# Patient Record
Sex: MALE | Race: WHITE | Employment: PART TIME | URBAN - METROPOLITAN AREA
[De-identification: names, ages, dates, MRNs, and addresses within clinical notes are randomized per-mention and may not be internally consistent; named-entity substitution may affect disease eponyms.]

---
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
## 2022-05-19 ENCOUNTER — OFFICE VISIT (OUTPATIENT)
Dept: FAMILY MEDICINE CLINIC | Age: 22
End: 2022-05-19
Payer: COMMERCIAL

## 2022-05-19 VITALS
HEART RATE: 72 BPM | DIASTOLIC BLOOD PRESSURE: 72 MMHG | SYSTOLIC BLOOD PRESSURE: 100 MMHG | HEIGHT: 69 IN | WEIGHT: 159 LBS | TEMPERATURE: 97.2 F | BODY MASS INDEX: 23.55 KG/M2 | OXYGEN SATURATION: 98 %

## 2022-05-19 DIAGNOSIS — H53.8 BLURRED VISION: ICD-10-CM

## 2022-05-19 DIAGNOSIS — G44.52 NEW DAILY PERSISTENT HEADACHE: Primary | ICD-10-CM

## 2022-05-19 PROCEDURE — 99204 OFFICE O/P NEW MOD 45 MIN: CPT | Performed by: PHYSICIAN ASSISTANT

## 2022-05-19 RX ORDER — LORATADINE 10 MG/1
10 TABLET ORAL DAILY
Qty: 30 TABLET | Refills: 5 | Status: SHIPPED | OUTPATIENT
Start: 2022-05-19

## 2022-05-19 RX ORDER — IBUPROFEN 800 MG/1
800 TABLET ORAL
Qty: 30 TABLET | Refills: 0 | Status: SHIPPED | OUTPATIENT
Start: 2022-05-19

## 2022-05-19 RX ORDER — FLUTICASONE PROPIONATE 50 MCG
1 SPRAY, SUSPENSION (ML) NASAL DAILY
Qty: 16 G | Refills: 0 | Status: SHIPPED | OUTPATIENT
Start: 2022-05-19

## 2022-05-19 ASSESSMENT — PATIENT HEALTH QUESTIONNAIRE - PHQ9
SUM OF ALL RESPONSES TO PHQ QUESTIONS 1-9: 0
SUM OF ALL RESPONSES TO PHQ9 QUESTIONS 1 & 2: 0
2. FEELING DOWN, DEPRESSED OR HOPELESS: 0
SUM OF ALL RESPONSES TO PHQ QUESTIONS 1-9: 0
1. LITTLE INTEREST OR PLEASURE IN DOING THINGS: 0
SUM OF ALL RESPONSES TO PHQ QUESTIONS 1-9: 0
SUM OF ALL RESPONSES TO PHQ QUESTIONS 1-9: 0

## 2022-05-19 ASSESSMENT — ENCOUNTER SYMPTOMS
DIARRHEA: 0
SORE THROAT: 0
VOMITING: 0
SINUS PRESSURE: 0
COUGH: 0
CONSTIPATION: 0
RHINORRHEA: 1
SINUS PAIN: 0
NAUSEA: 1
PHOTOPHOBIA: 1
ABDOMINAL PAIN: 0
SHORTNESS OF BREATH: 0
WHEEZING: 0
COLOR CHANGE: 0

## 2022-05-19 NOTE — PROGRESS NOTES
[address] [facility]  [address] [facility] [address] [address]  Dept: [phone]  Dept Fax: [phone]    Stef Montana is a 22 y.o. male who presents today for his medical conditions/complaintsas noted below. Stef Montana is c/o of   Chief Complaint   Patient presents with    Migraine     x 2 months started getting them, no nausea or vomiting, the whole head, blurred vision, feels like he could faint    Abdominal Pain     on and off x 1 years,          HPI:     Headache   This is a new problem. The current episode started more than 1 month ago (2 or more ). The problem occurs intermittently. The problem has been waxing and waning. The pain is located in the temporal region. The pain does not radiate. The pain quality is similar to prior headaches. The quality of the pain is described as aching. The pain is moderate. Associated symptoms include nausea, phonophobia, photophobia and rhinorrhea. Pertinent negatives include no abdominal pain, coughing, dizziness, fever, loss of balance, sinus pressure, sore throat, vomiting or weakness. Nothing (sometimes worse when he wakes up. doesn't wake him up. ) aggravates the symptoms. He has tried nothing (has not tried anything) for the symptoms. Improvement on treatment: drinks caffeine/natural caffeines, occasional weed use. His past medical history is significant for migraines in the family (aunt). Patient new to the office and here with his mom for concerns of migraines. Recently home from college for the week, currently goes to college in [place]. Healthy diet, avoids fast foods, avoids fried foods. Drinks water mostly. 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 Sleep schedule does very night tonight. Patient states headaches sometimes are very severe when he wakes up in the morning. They do not wake him from sleep but are almost always present when he wakes up. He does have some blurred vision with this.   He does wear contacts but states they seem to work fine. Last eye exam was just over a year ago. He also reports intermittent abdominal pain off and on for the last year or more. He has tried to improve his diet and presently eats a very clean diet. No noted triggers to the discomfort. Tends to be off and on throughout the day. No change in bowel or bladder. He is not seeing any blood in any of his bowel movements or in urination. Was previously treated by pediatrician, mom states has been fully vaccinated. Healthy childhood.   He does occasionally use marijuana, no nicotine products    No results found for: LABA1C          ( goal A1Cis < 7)   No results found for: LABMICR  No results found for: LDLCHOLESTEROL, LDLCALC    (goal LDL is <100)   No results found for: AST, ALT, BUN, CR  BP Readings from Last 3 Encounters:   05/19/22 100/72   04/19/19 122/69 (69 %, Z = 0.50 /  57 %, Z = 0.18)*   01/15/19 133/79     *BP percentiles are based on the 2017 AAP Clinical Practice Guideline for boys          (goal 120/80)    Past Medical History:   Diagnosis Date    Headache     Shoulder dislocation       Past Surgical History:   Procedure Laterality Date    TONSILLECTOMY         Family History   Problem Relation Age of Onset    No Known Problems Mother     No Known Problems Father     Other Maternal Aunt         aunt    High Blood Pressure Maternal Aunt     High Blood Pressure Maternal Grandfather        Social History     Tobacco Use    Smoking status: Never Smoker    Smokeless tobacco: Never Used   Substance Use Topics    Alcohol use: No      Current Outpatient Medications   Medication Sig Dispense Refill    loratadine (CLARITIN) 10 MG tablet Take 1 tablet by mouth daily 30 tablet 5    fluticasone (FLONASE) 50 MCG/ACT nasal spray 1 spray by Each Nostril route daily 16 g 0    ibuprofen (ADVIL;MOTRIN) 800 MG tablet Take 1 tablet by mouth 3 times daily (with meals) 30 tablet 0     No current facility-administered medications for this visit. No Known Allergies    Health Maintenance   Topic Date Due    COVID-19 Vaccine (1) Never done    Depression Screen  Never done    HIV screen  Never done    Hepatitis C screen  Never done    Hepatitis A vaccine (2 of 2 - 2-dose series) 11/11/2021    Flu vaccine (Season Ended) 09/01/2022    DTaP/Tdap/Td vaccine (7 - Td or Tdap) 10/22/2023    Hepatitis B vaccine  Completed    HPV vaccine  Completed    Varicella vaccine  Completed    Meningococcal (ACWY) vaccine  Completed    Pneumococcal 0-64 years Vaccine  Completed    Hib vaccine  Aged Out       Subjective:     Review of Systems   Constitutional: Negative for activity change, appetite change, fatigue, fever and unexpected weight change. /72 (Site: Left Upper Arm, Position: Sitting, Cuff Size: Large Adult)   Pulse 72   Temp 97.2 °F (36.2 °C) (Tympanic)   Ht 5' 9\" (1.753 m)   Wt 159 lb (72.1 kg)   SpO2 98%   BMI 23.48 kg/m²    HENT: Positive for postnasal drip, rhinorrhea and sneezing. Negative for congestion, sinus pressure, sinus pain and sore throat. Eyes: Positive for photophobia and visual disturbance. Respiratory: Negative for cough, shortness of breath and wheezing. Cardiovascular: Negative for chest pain and palpitations. Gastrointestinal: Positive for nausea. Negative for abdominal pain, constipation, diarrhea and vomiting. Skin: Negative for color change, pallor, rash and wound. Neurological: Positive for headaches. Negative for dizziness, facial asymmetry, weakness, light-headedness and loss of balance. Hematological: Negative for adenopathy. Psychiatric/Behavioral: Positive for sleep disturbance (changing schedule). The patient is not nervous/anxious. Objective:     Physical Exam  Vitals and nursing note reviewed. Constitutional:       General: He is not in acute distress. Appearance: Normal appearance. He is well-developed.  He is not ill-appearing. HENT:      Head: Normocephalic and atraumatic. Right Ear: Tympanic membrane, ear canal and external ear normal. There is no impacted cerumen. Left Ear: Tympanic membrane, ear canal and external ear normal.      Nose: Nose normal. No congestion or rhinorrhea. Mouth/Throat:      Mouth: Mucous membranes are moist.      Pharynx: No oropharyngeal exudate or posterior oropharyngeal erythema. Cardiovascular:      Rate and Rhythm: Normal rate and regular rhythm. Heart sounds: No murmur heard. Pulmonary:      Effort: Pulmonary effort is normal. No respiratory distress. Breath sounds: Normal breath sounds. No wheezing, rhonchi or rales. Abdominal:      General: There is no distension. Palpations: Abdomen is soft. There is no mass. Tenderness: There is no abdominal tenderness. There is no right CVA tenderness, left CVA tenderness, guarding or rebound. Musculoskeletal:      Cervical back: Neck supple. Lymphadenopathy:      Cervical: No cervical adenopathy. Skin:     General: Skin is warm and dry. Coloration: Skin is not pale. Findings: No erythema or rash. Neurological:      Mental Status: He is alert and oriented to person, place, and time. Psychiatric:         Mood and Affect: Mood normal.         Behavior: Behavior normal.         Thought Content: Thought content normal.         Judgment: Judgment normal.       /72 (Site: Left Upper Arm, Position: Sitting, Cuff Size: Large Adult)   Pulse 72   Temp 97.2 °F (36.2 °C) (Tympanic)   Ht 5' 9\" (1.753 m)   Wt 159 lb (72.1 kg)   SpO2 98%   BMI 23.48 kg/m²     Assessment:       Diagnosis Orders   1. New daily persistent headache  loratadine (CLARITIN) 10 MG tablet    fluticasone (FLONASE) 50 MCG/ACT nasal spray    MRI BRAIN WO CONTRAST    ibuprofen (ADVIL;MOTRIN) 800 MG tablet   2.  Blurred vision  loratadine (CLARITIN) 10 MG tablet    fluticasone (FLONASE) 50 MCG/ACT nasal spray    MRI BRAIN WO CONTRAST             Plan:      Return if symptoms worsen or fail to improve, for await results. Patient with new onset of daily headaches for the last 2 months. We will check an MRI of the brain noncontrast due to blurred vision along with this. Can get an eye exam as well. Has had some seasonal symptoms also will start Claritin and Flonase daily. Recommended he start a headache tracker to see if there is any trend as to the cause. Discussed Motrin 800 mg at onset of headache, use sparingly and always take with food. Await results  Abdominal cramping and discomfort is intermittent and has been going on for some time. I recommended he start a food diary to see if there is any trigger that can be identified. We also discussed starting a probiotic daily. Continue healthy diet and water intake well daily. I encouraged him to avoid marijuana as this can trigger abdominal symptoms  Patient is leaving to return to [place] this weekend.   If he is unable to get imaging done or if symptoms are worsening he should seek care at an urgent care or ER in [place] for further work-up  Patient and mom agreed with treatment plan    Orders Placed This Encounter   Procedures    MRI BRAIN WO CONTRAST     Standing Status:   Future     Standing Expiration Date:   2023     Scheduling Instructions:      As soon as possible as patient traveling     Order Specific Question:   Reason for exam:     Answer:   cephalgia with vision change     Orders Placed This Encounter   Medications    loratadine (CLARITIN) 10 MG tablet     Sig: Take 1 tablet by mouth daily     Dispense:  30 tablet     Refill:  5    fluticasone (FLONASE) 50 MCG/ACT nasal spray     Si spray by Each Nostril route daily     Dispense:  16 g     Refill:  0    ibuprofen (ADVIL;MOTRIN) 800 MG tablet     Sig: Take 1 tablet by mouth 3 times daily (with meals)     Dispense:  30 tablet     Refill:  0       Patient given educational materials - see patient instructions. Discussed use, benefit, and side effects of prescribed medications. All patientquestions answered. Pt voiced understanding. Reviewed health maintenance. Instructedto continue current medications, diet and exercise. Patient agreed with treatmentplan. Follow up as directed. Please note that this chart was generated using voice recognition Dragon dictation software. Although every effort was made to ensure the accuracy of this automated transcription, some errors in transcription may have occurred.      Electronically signed by Nolvia Mulligan PA-C on 5/19/2022 at 5:10 PM

## 2022-05-19 NOTE — PROGRESS NOTES
Visit Information    Have you changed or started any medications since your last visit including any over-the-counter medicines, vitamins, or herbal medicines? no   Are you having any side effects from any of your medications? -  no  Have you stopped taking any of your medications? Is so, why? -  no    Have you seen any other physician or provider since your last visit? No  Have you had any other diagnostic tests since your last visit? No  Have you been seen in the emergency room and/or had an admission to a hospital since we last saw you? No  Have you had your routine dental cleaning in the past 6 months? no    Have you activated your [employer] account? If not, what are your barriers?  No:      Patient Care Team:  Casey Cardona PA-C as PCP - General (Physician Assistant)  Miguel A Mendoza MD as Consulting Physician (Orthopedic Surgery)    Medical History Review  Past Medical, Family, and Social History reviewed and  contribute to the patient presenting condition    Health Maintenance   Topic Date Due    COVID-19 Vaccine (1) Never done    Depression Screen  Never done    HIV screen  Never done    Hepatitis C screen  Never done    Hepatitis A vaccine (2 of 2 - 2-dose series) 11/11/2021    Flu vaccine (Season Ended) 09/01/2022    DTaP/Tdap/Td vaccine (7 - Td or Tdap) 10/22/2023    Hepatitis B vaccine  Completed    HPV vaccine  Completed    Varicella vaccine  Completed    Meningococcal (ACWY) vaccine  Completed    Pneumococcal 0-64 years Vaccine  Completed    Hib vaccine  Aged Out

## 2022-05-24 DIAGNOSIS — H53.8 BLURRED VISION: ICD-10-CM

## 2022-05-24 DIAGNOSIS — G44.52 NEW DAILY PERSISTENT HEADACHE: ICD-10-CM
